# Patient Record
Sex: MALE | Race: WHITE | NOT HISPANIC OR LATINO | Employment: OTHER | URBAN - METROPOLITAN AREA
[De-identification: names, ages, dates, MRNs, and addresses within clinical notes are randomized per-mention and may not be internally consistent; named-entity substitution may affect disease eponyms.]

---

## 2021-03-27 ENCOUNTER — NURSE TRIAGE (OUTPATIENT)
Dept: NURSING | Facility: CLINIC | Age: 26
End: 2021-03-27

## 2021-03-27 NOTE — TELEPHONE ENCOUNTER
"Not current [facility] patient but called FNA through insurance.    Mom called to see what services are available. Patient does not know she is calling for information. She says he is \"open to seeing a therapist\" but not taking any medications. Mom says she has been more concerned about him in the past couple days but she has noticed for one year or more that there was some issue going on with patient.     She says they recently moved back to [place] from [place] and patient has no close friends and seems trapped; says he graduated college and has been staying with them due to COVID. Patient is not drinking but using \"a little marijuana.\" Mom reports patient denies voices but says things are \"glowing.\"    She is concerned about him because is \"talking funny\" or bringing up odd subjects (  INTJ personality, \"empath\"), sending songs w/ strange lyrics to his brother and sister. Mom says she has some concerns about suicide but patient says denies stating \"I'm going to stay with you\" and mom denies any suicide attempts in the past or current plans/intents. Parents do not have any weapons in the home. She is not concerned about safety for him or them.    FNA advised he should be seen in primary care, they can refer him to therapy. FNA gave them the mental health intake number to call. FNA advised [facility] (patient is close to this location) or [facility] for mental health eval if needed. Advised to call back with concerns/questions and caller verbalizes understanding.      Additional Information    Negative: Patient attempted suicide    Negative: Patient is threatening suicide now    Negative: Hearing voices that are telling patient to commit suicide now    Negative: Violent behavior, or threatening to physically hurt or kill someone    Negative: Hearing voices that are telling patient to kill a specific person    Negative: [1] Patient is very confused (disoriented, slurred speech) AND [2] no other adult (e.g., friend or family " "member) available    Negative: [1] Difficult to awaken or acting very confused (disoriented, slurred speech) AND [2] new onset    Negative: Drug overdose suspected    Negative: Sounds like a life-threatening emergency to the triager    Negative: [1] Schizophrenia AND [2] unable to do any of normal activities (e.g., self care, school, work; in comparison to baseline).    Negative: Head is twisting to one side (or ask \"is it turning against your will?\")    Negative: Patient sounds very sick or weak to the triager    Negative: [1] Schizophrenia AND [2] worsening (e.g., increasing voices, thinking less clearly, more agitated, less able to do activities of daily living)    Negative: Increasing restlessness or pacing    Negative: Sometimes has thoughts of suicide    Negative: Sometimes hears voices telling him/her to commit suicide    Negative: Sometimes has thoughts of killing or hurting others    Negative: Sometimes hears voices telling him/her to kill or hurt others    Negative: Fever > 101 F (38.3 C)    Negative: Alcohol or drug abuse, known or suspected    Requesting to talk with a counselor (mental health worker, psychiatrist, etc.)    Negative: RN needs further essential information from caller in order to complete triage    Negative: Requesting regular office appointment    Negative: [1] Caller requesting NON-URGENT health information AND [2] PCP's office is the best resource    General information question, no triage required and triager able to answer question    Negative: Health Information question, no triage required and triager able to answer question    Protocols used: SCHIZOPHRENIA-A-, INFORMATION ONLY CALL-A-      "

## 2021-03-29 ENCOUNTER — OFFICE VISIT (OUTPATIENT)
Dept: FAMILY MEDICINE | Facility: CLINIC | Age: 26
End: 2021-03-29
Payer: COMMERCIAL

## 2021-03-29 VITALS
DIASTOLIC BLOOD PRESSURE: 77 MMHG | TEMPERATURE: 99.1 F | SYSTOLIC BLOOD PRESSURE: 140 MMHG | HEART RATE: 58 BPM | WEIGHT: 181 LBS | OXYGEN SATURATION: 97 % | BODY MASS INDEX: 23.99 KG/M2 | HEIGHT: 73 IN

## 2021-03-29 DIAGNOSIS — R45.851 SUICIDAL IDEATION: ICD-10-CM

## 2021-03-29 DIAGNOSIS — F39 MOOD DISORDER (H): Primary | ICD-10-CM

## 2021-03-29 DIAGNOSIS — F41.9 ANXIETY AND DEPRESSION: ICD-10-CM

## 2021-03-29 DIAGNOSIS — F32.A ANXIETY AND DEPRESSION: ICD-10-CM

## 2021-03-29 DIAGNOSIS — R44.1 VISUAL HALLUCINATION: ICD-10-CM

## 2021-03-29 DIAGNOSIS — Z87.820 HX OF MULTIPLE CONCUSSIONS: ICD-10-CM

## 2021-03-29 PROCEDURE — 99204 OFFICE O/P NEW MOD 45 MIN: CPT | Performed by: INTERNAL MEDICINE

## 2021-03-29 RX ORDER — MULTIVITAMIN WITH IRON
1 TABLET ORAL DAILY
COMMUNITY

## 2021-03-29 RX ORDER — VITAMIN B COMPLEX
TABLET ORAL DAILY
COMMUNITY

## 2021-03-29 SDOH — HEALTH STABILITY: MENTAL HEALTH: HOW MANY STANDARD DRINKS CONTAINING ALCOHOL DO YOU HAVE ON A TYPICAL DAY?: NOT ASKED

## 2021-03-29 SDOH — HEALTH STABILITY: MENTAL HEALTH: HOW OFTEN DO YOU HAVE 6 OR MORE DRINKS ON ONE OCCASION?: NOT ASKED

## 2021-03-29 SDOH — HEALTH STABILITY: MENTAL HEALTH: HOW OFTEN DO YOU HAVE A DRINK CONTAINING ALCOHOL?: NOT ASKED

## 2021-03-29 ASSESSMENT — MIFFLIN-ST. JEOR: SCORE: 1860.92

## 2021-03-29 ASSESSMENT — ANXIETY QUESTIONNAIRES
5. BEING SO RESTLESS THAT IT IS HARD TO SIT STILL: NEARLY EVERY DAY
7. FEELING AFRAID AS IF SOMETHING AWFUL MIGHT HAPPEN: MORE THAN HALF THE DAYS
6. BECOMING EASILY ANNOYED OR IRRITABLE: NEARLY EVERY DAY
2. NOT BEING ABLE TO STOP OR CONTROL WORRYING: MORE THAN HALF THE DAYS
3. WORRYING TOO MUCH ABOUT DIFFERENT THINGS: NEARLY EVERY DAY
1. FEELING NERVOUS, ANXIOUS, OR ON EDGE: NEARLY EVERY DAY
IF YOU CHECKED OFF ANY PROBLEMS ON THIS QUESTIONNAIRE, HOW DIFFICULT HAVE THESE PROBLEMS MADE IT FOR YOU TO DO YOUR WORK, TAKE CARE OF THINGS AT HOME, OR GET ALONG WITH OTHER PEOPLE: VERY DIFFICULT
GAD7 TOTAL SCORE: 18

## 2021-03-29 ASSESSMENT — PATIENT HEALTH QUESTIONNAIRE - PHQ9
5. POOR APPETITE OR OVEREATING: MORE THAN HALF THE DAYS
SUM OF ALL RESPONSES TO PHQ QUESTIONS 1-9: 20

## 2021-03-29 NOTE — PROGRESS NOTES
Assessment & Plan     Mood disorder (H)  Suicidal ideation  Anxiety and depression  Visual hallucinations  Discussed value of mental health evaluation and recommendations for diagnostic clarification (bipolar vs schizoaffective vs severe depression vs other?) and treatment, both with psychologist and psychiatrist. Patient agreeable. He declines blood work today but I have let him know to be prepared for blood work next visit so we have a good baseline.  Patient and his mother are both agreeable to call number below to schedule appointments.   In the meantime, I have provided ED precautions if suicidal plans, homicidal thoughts or plans, or overall worsening in symptoms to [facility] ED. They state understanding.     - MENTAL HEALTH REFERRAL  - Adult; Psychiatry; Psychiatry and Psychotherapy (Individual/Couple/Family Therapy); Collaborative Care Psychiatry Service and [facility] [phone]; Yes (diagnositc clarificaitons and optimizing maribell...    Hx of multiple concussions  Used to play football  Check CT head for completeness   - CT Head w/o Contrast        Return in about 6 weeks (around 5/10/2021) for Follow up, with me, in person, sooner if symptoms worsen or do not improve.    Angela Zazueta,   [facility] eDvi is a 25 year old who presents for the following health issues     HPI     Former PCP: None  Specialists: None  Problem list and medications reviewed and updated. See below for additional notes.    Patient here with his mother to discuss mood. Mother is concerned about patient's mood. Patient relays he was living in [place] but currently here in [place] to get help he needs. He relays experiencing depression in the past and had triggers in the past but states got better, never required rx medication or psychiatrist eval, states these triggers have reappeared (potentially people who are his triggers) as of late and states last 4 days  "feeling worse, having visual hallucinations (trees pulsate), having SI. No plans, no HI. States wouldn't do that. States used to have sadistic personality, states not anymore. States used to try psychedelic drugs, no drugs at this point. 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     CHARLES-7  3/29/2021   1. Feeling nervous, anxious, or on edge 3   2. Not being able to stop or control worrying 2   3. Worrying too much about different things 3   4. Trouble relaxing 2   5. Being so restless that it is hard to sit still 3   6. Becoming easily annoyed or irritable 3   7. Feeling afraid, as if something awful might happen 2   CHARLES-7 Total Score 18   If you checked any problems, how difficult have they made it for you to do your work, take care of things at home, or get along with other people? Very difficult   Last PHQ-9 3/29/2021   1.  Little interest or pleasure in doing things 3   2.  Feeling down, depressed, or hopeless 2   3.  Trouble falling or staying asleep, or sleeping too much 1   4.  Feeling tired or having little energy 2   5.  Poor appetite or overeating 1   6.  Feeling bad about yourself 2   7.  Trouble concentrating 3   8.  Moving slowly or restless 3   Q9: Thoughts of better off dead/self-harm past 2 weeks 3   PHQ-9 Total Score 20   Difficulty at work, home, or with people Very difficult         Review of Systems   As per HPI      Objective    BP (!) 140/77 (BP Location: Right arm, Cuff Size: Adult Regular)   Pulse 58   Temp 99.1  F (37.3  C) (Temporal)   Ht 1.848 m (6' 0.75\")   Wt 82.1 kg (181 lb)   SpO2 97%   BMI 24.04 kg/m    Body mass index is 24.04 kg/m .  Physical Exam     GENERAL APPEARANCE: AAOx3, no distress. Well developed.    PSYCH: appropriate mood and affect.           "

## 2021-03-30 ASSESSMENT — ANXIETY QUESTIONNAIRES: GAD7 TOTAL SCORE: 18

## 2021-04-08 ENCOUNTER — HOSPITAL ENCOUNTER (OUTPATIENT)
Dept: CT IMAGING | Facility: CLINIC | Age: 26
Discharge: HOME | End: 2021-04-08
Attending: INTERNAL MEDICINE | Admitting: INTERNAL MEDICINE
Payer: COMMERCIAL

## 2021-04-08 DIAGNOSIS — Z87.820 HX OF MULTIPLE CONCUSSIONS: ICD-10-CM

## 2021-04-08 PROCEDURE — 70450 CT HEAD/BRAIN W/O DYE: CPT

## 2021-04-10 ENCOUNTER — HEALTH MAINTENANCE LETTER (OUTPATIENT)
Age: 26
End: 2021-04-10

## 2021-06-17 ENCOUNTER — TELEPHONE (OUTPATIENT)
Dept: PSYCHOLOGY | Facility: CLINIC | Age: 26
End: 2021-06-17

## 2021-06-17 DIAGNOSIS — F32.A ANXIETY AND DEPRESSION: Primary | ICD-10-CM

## 2021-06-17 DIAGNOSIS — F41.9 ANXIETY AND DEPRESSION: Primary | ICD-10-CM

## 2021-06-17 NOTE — TELEPHONE ENCOUNTER
P/C to patient today to complete initial visit for individual therapy. Mental Health referral placed on 3/29/2021 by Dr. Zazueta. We discussed reasons for referral. Patient states he wanted an EEG test completed. Provider encouraged patient to reconnect with Dr. Zazueta regarding results of CT head scan and if a EEG is appropriate. Patient notes he is receiving mental health services from [facility], and believe this session would be a duplicate in services. Provider clarified will cancel today's appointment. Patient is welcomed to reach out to [facility] again by calling [phone] if services are needed in the future.       SERAFIN Cortez [facility]   June 17, 2021

## 2021-09-19 ENCOUNTER — HEALTH MAINTENANCE LETTER (OUTPATIENT)
Age: 26
End: 2021-09-19

## 2022-05-01 ENCOUNTER — HEALTH MAINTENANCE LETTER (OUTPATIENT)
Age: 27
End: 2022-05-01

## 2022-06-18 ENCOUNTER — HOSPITAL ENCOUNTER (EMERGENCY)
Facility: CLINIC | Age: 27
Discharge: HOME | End: 2022-06-18
Attending: EMERGENCY MEDICINE | Admitting: EMERGENCY MEDICINE
Payer: MEDICAID

## 2022-06-18 VITALS
TEMPERATURE: 97.2 F | SYSTOLIC BLOOD PRESSURE: 118 MMHG | DIASTOLIC BLOOD PRESSURE: 56 MMHG | BODY MASS INDEX: 25.24 KG/M2 | RESPIRATION RATE: 22 BRPM | HEIGHT: 71 IN | HEART RATE: 69 BPM | OXYGEN SATURATION: 96 %

## 2022-06-18 DIAGNOSIS — F10.920 ALCOHOLIC INTOXICATION WITHOUT COMPLICATION (H): ICD-10-CM

## 2022-06-18 PROCEDURE — 99285 EMERGENCY DEPT VISIT HI MDM: CPT

## 2022-06-18 PROCEDURE — 93005 ELECTROCARDIOGRAM TRACING: CPT

## 2022-06-18 NOTE — ED NOTES
Pt. Awake, refusing to give blood. MD in room to help explain what happened. Pt. States he does not remember what happened. Pt. Gets up and is pacing in room. Pt. Needs much redirection, security presence.

## 2022-06-18 NOTE — ED NOTES
Pt. Continues to come out into nurses station and pace. Pt. Did  MDs water bottle off his desk and did put it down on request of security. Pt. Asking for water, given. Meal offered, explained to pt. What we need to see before he can discharge. Pt. breathalizer reading at 0.168. Pt. Understands plan at this time and returns back to room.

## 2022-06-18 NOTE — ED TRIAGE NOTES
Patient was found lying face down in front of [employer] in [place]. Possible drugs and alcohol per ems but no paraphernalia found on patient per ems. Placed on hold by PD.

## 2022-06-19 NOTE — ED PROVIDER NOTES
History   Chief Complaint:  Altered Mental Status       The history is provided by the EMS personnel. The history is limited by the condition of the patient.      Marito Lloyd is a 27 year old male with history of anixety and depression who presents to the ED via EMS on RODO hold after being found passed out on the ground appearing intoxicated outside of the [employer].  Patient presents very somnolent but does arouse to vigorous stimulation and is unable to provide any history initially.  No reported seizure activity or signs of trauma.  No hypoglycemia prior to arrival per EMS report.  Other history unable to be obtained as patient is noncompliant with history gathering due to intoxication.    Review of Systems   Unable to perform ROS: Mental status change       Allergies:  No Known Allergies    Medications:  CAFFEINE PO  Omega-3 Fatty Acids (FISH OIL PO)  vitamin (B COMPLEX-C) tablet  Vitamin D3 (CHOLECALCIFEROL) 25 mcg (1000 units) tablet        Past Medical History:       Patient Active Problem List   Diagnosis     Suicidal ideation     Anxiety and depression         Past Surgical History:    No known past surgical history based on chart review     Family History:    Unable to obtain due to AMS      Social History:  Patient presents to the ED alone. Further social history unable to obtain.  Social History     Socioeconomic History     Marital status: Single     Spouse name: Not on file     Number of children: Not on file     Years of education: Not on file     Highest education level: Not on file   Occupational History     Not on file   Tobacco Use     Smoking status: Former Smoker     Smokeless tobacco: Former User   Substance and Sexual Activity     Alcohol use: Not Currently     Drug use: Yes     Types: Marijuana     Sexual activity: Not on file   Other Topics Concern     Not on file   Social History Narrative     Not on file     Social Determinants of Health     Financial Resource Strain: Not  "on file   Food Insecurity: Not on file   Transportation Needs: Not on file   Physical Activity: Not on file   Stress: Not on file   Social Connections: Not on file   Intimate Partner Violence: Not on file   Housing Stability: Not on file       Physical Exam     Patient Vitals for the past 24 hrs:   BP Temp Temp src Pulse Resp SpO2 Height   06/18/22 0339 -- -- -- 69 22 -- --   06/18/22 0336 118/56 97.2  F (36.2  C) Temporal -- -- 96 % 1.803 m (5' 11\")       Physical Exam  General: Somnolent, arouses to vigorous stimuli. Resting comfortably  Head:  Scalp, face, and head appear normal, atraumatic   Eyes:  Pupils are equal, round, reactive to light. No nystagmus.    Conjunctivae non-injected and sclerae white  ENT:    The external nose is normal    Pinnae are normal  Neck:  Normal range of motion    There is no rigidity noted    Trachea is in the midline  CV:  Regular rate and rhythm     Normal S1/S2, no S3/S4    No murmur or rub. Radial pulses 2+ bilaterally.  Resp:  Lungs are clear and equal bilaterally  There is no tachypnea    No increased work of breathing    No rales, wheezing, or rhonchi  GI:  Abdomen is soft, no rigidity or guarding    No distension, or mass    No tenderness or rebound tenderness   MS:  Normal muscular tone    Symmetric motor strength    No lower extremity edema  Skin:  No rash or acute skin lesions noted  Neuro: Somnolent. Repositions self into position of comfort. Moves all extremities spontaneously with normal strength. Responds to tactile stimuli in all extremities.   Speech is slurred  Moves all extremities spontaneously  Psych:  No agitation      Emergency Department Course   ECG    Imaging:  No orders to display       Laboratory:  Labs Ordered and Resulted from Time of ED Arrival to Time of ED Departure - No data to display     Procedures    Emergency Department Course:             Reviewed:  I reviewed nursing notes, vitals and past medical history    Assessments/Consults:  ED Course as " of 06/18/22 2130   Sat Jun 18, 2022   0728 Informed by RN staff that patient eloped from the ED.        Interventions:  Medications - No data to display    Disposition:  Patient eloped from the ED    Impression & Plan       Medical Decision Making:  Marito Lloyd is a 27 year old male tree of anxiety depression and polysubstance abuse who presented via EMS with altered mental status and intoxication after being found passed out on the street.  On my evaluation the patient is hemodynamically stable, afebrile.  He is unable to provide history initially and was responsive to tactile stimuli but could not maintain alertness.  Pupils equal round and reactive.  No hypopnea or pinpoint pupils to suggest acute opiate toxidrome.  Initially large ED work-up was ordered however when nursing staff attempted to interact with the patient to obtain lab work, place IV and EKG the patient suddenly woke up and became agitated but redirectable.  He was not able to recall how or why he ended up in the emergency department.  He got up from the ED stretcher and was pacing around the ED treatment room with a stable gait refusing all interventions.  Patient denied any thoughts of self-harm or suicide.  The patient did admit to substance use but was very vague and would not state what he had been using.  He was redirectable and ultimately did provide a breathalyzer reading which was 0.168.  He remained alert with no focal neurologic deficits and continued to refuse any further interventions or testing.  He was provided with a turkey sandwich and apple juice which he tolerated without difficulty.  His findings are consistent with acute alcohol intoxication additional use of other substances as possible but unknown at this time.  Patient was monitored in the emergency department with plan to continue to monitor him.  I was informed by nursing staff however at 7:28 AM that the patient had eloped from the emergency department and could not  be found on the premises.  Patient was ambulatory with a steady gait at the time he left the emergency department.  Patient was in stable and improved condition when he eloped.      Diagnosis:    ICD-10-CM    1. Alcoholic intoxication without complication (H)  F10.920        Discharge Medications:  Discharge Medication List as of 6/18/2022  7:40 AM          This note was created in part using medical voice dictation software and undetected accidental word errors, substitutions, or other automated transcription errors may occur.      Aldo Wan MD  06/18/22 3243

## 2022-11-21 ENCOUNTER — HEALTH MAINTENANCE LETTER (OUTPATIENT)
Age: 27
End: 2022-11-21

## 2023-06-02 ENCOUNTER — HEALTH MAINTENANCE LETTER (OUTPATIENT)
Age: 28
End: 2023-06-02

## 2024-06-23 ENCOUNTER — HEALTH MAINTENANCE LETTER (OUTPATIENT)
Age: 29
End: 2024-06-23

## 2025-07-12 ENCOUNTER — HEALTH MAINTENANCE LETTER (OUTPATIENT)
Age: 30
End: 2025-07-12